# Patient Record
Sex: FEMALE | Race: WHITE
[De-identification: names, ages, dates, MRNs, and addresses within clinical notes are randomized per-mention and may not be internally consistent; named-entity substitution may affect disease eponyms.]

---

## 2017-01-30 ENCOUNTER — HOSPITAL ENCOUNTER (OUTPATIENT)
Dept: HOSPITAL 45 - C.MAMM | Age: 72
Discharge: HOME | End: 2017-01-30
Attending: FAMILY MEDICINE
Payer: COMMERCIAL

## 2017-01-30 DIAGNOSIS — Z86.000: ICD-10-CM

## 2017-01-30 DIAGNOSIS — Z12.31: Primary | ICD-10-CM

## 2017-01-30 NOTE — MAMMOGRAPHY REPORT
UNILATERAL LEFT DIGITAL SCREENING MAMMOGRAM TOMOSYNTHESIS WITH CAD: 1/30/2017

CLINICAL HISTORY: Asymptomatic. Personal history of breast cancer.  





TECHNIQUE: Left breast tomosynthesis in addition to standard 2D mammography was performed. Current rashawn dawson was also evaluated with a Computer Aided Detection (CAD) system.  



COMPARISON: Comparison is made to exams dated:  1/27/2016 mammogram, 10/23/2014 mammogram, 12/9/2013
 mammogram, and 11/19/2012 mammogram - [facility].   



BREAST COMPOSITION:  The tissue of the left breast is heterogeneously dense, which may obscure small
 masses.  



FINDINGS: The hub of a Mediport catheter is partially visualized in the far superior left breast on 
the MLO view.  There is stable asymmetry in the inferior left breast.  Scattered benign rim calcific
ations and vascular calcifications in the left breast.  No suspicious mass, architectural distortion
 or cluster of suspicious microcalcifications is seen.  



IMPRESSION:  ACR BI-RADS CATEGORY 1: NEGATIVE

There is no mammographic evidence of malignancy. A 1 year screening mammogram is recommended.  The p
atient will receive written notification of the results.  





Approximately 10% of breast cancers are not detected with mammography. A negative mammographic repor
t should not delay biopsy if a clinically suggestive mass is present.



Isatu Richards M.D.          

ay/:1/30/2017 15:28:11  



Imaging Technologist: Jada Burleson, [facility]

letter sent: Normal 1/2  

BI-RADS Code: ACR BI-RADS Category 1: Negative

## 2017-03-27 ENCOUNTER — HOSPITAL ENCOUNTER (OUTPATIENT)
Dept: HOSPITAL 45 - C.RDSM | Age: 72
Discharge: HOME | End: 2017-03-27
Attending: FAMILY MEDICINE
Payer: COMMERCIAL

## 2017-03-27 DIAGNOSIS — R22.32: Primary | ICD-10-CM

## 2017-03-27 DIAGNOSIS — M19.042: ICD-10-CM

## 2017-03-27 DIAGNOSIS — M79.9: ICD-10-CM

## 2017-03-27 NOTE — DIAGNOSTIC IMAGING REPORT
LEFT HAND MIN 3 VIEWS



CLINICAL HISTORY: MASS ON POSTERIOR LEFT HAND    



COMPARISON: None.



DISCUSSION: Considerable degenerative change of the first carpometacarpal joint.

Moderate degenerative change throughout all remaining osseous structures. No

well-defined osseous mass. No significant bony exostosis. Mild generalized soft

tissue edema presumably on a degenerative basis.



IMPRESSION: Considerable degenerative changes throughout. Moderate generalized

soft tissue edema.







Electronically signed by:  Cole Simpson M.D.

3/27/2017 4:54 PM



Dictated Date/Time:  3/27/2017 4:53 PM

## 2017-10-03 ENCOUNTER — HOSPITAL ENCOUNTER (OUTPATIENT)
Dept: HOSPITAL 45 - C.RDSM | Age: 72
Discharge: HOME | End: 2017-10-03
Attending: ORTHOPAEDIC SURGERY
Payer: COMMERCIAL

## 2017-10-03 DIAGNOSIS — M70.72: Primary | ICD-10-CM

## 2017-10-03 DIAGNOSIS — Z96.642: ICD-10-CM

## 2018-01-31 ENCOUNTER — HOSPITAL ENCOUNTER (OUTPATIENT)
Dept: HOSPITAL 45 - C.MAMM | Age: 73
Discharge: HOME | End: 2018-01-31
Attending: FAMILY MEDICINE
Payer: COMMERCIAL

## 2018-01-31 DIAGNOSIS — Z12.31: Primary | ICD-10-CM

## 2018-01-31 DIAGNOSIS — Z85.3: ICD-10-CM

## 2018-01-31 DIAGNOSIS — Z08: ICD-10-CM

## 2018-01-31 NOTE — MAMMOGRAPHY REPORT
UNILATERAL LEFT DIGITAL SCREENING MAMMOGRAM TOMOSYNTHESIS WITH CAD: 1/31/2018

CLINICAL HISTORY: Asymptomatic. Personal history of breast cancer.  





TECHNIQUE:  Breast tomosynthesis in addition to standard 2D mammography was performed. Current study 
was also evaluated with a Computer Aided Detection (CAD) system.  



COMPARISON: Comparison is made to exams dated:  1/30/2017 mammogram, 1/27/2016 mammogram, 10/23/2014 
mammogram, 12/9/2013 mammogram, 11/19/2012 mammogram, and 11/22/2011 mammogram - [facility].   



BREAST COMPOSITION:  The tissue of the left breast is heterogeneously dense, which may obscure small 
masses.  



FINDINGS:  There are no suspicious masses, calcifications, or areas of architectural distortion noted
 within the left breast.  There has been no significant interval change compared to prior exams.  Sca
ttered benign-appearing calcifications are not significantly changed.  Asymmetries in the left superi
or and inferior breast are stable compared to multiple prior exams including the 2012 exam.



IMPRESSION:  ACR BI-RADS CATEGORY 2: BENIGN

There is no mammographic evidence of malignancy in the left breast. A 1 year screening mammogram is r
ecommended.  The patient will receive written notification of the results.  





Approximately 10% of breast cancers are not detected with mammography. A negative mammographic report
 should not delay biopsy if a clinically suggestive mass is present.



Pooja Cotton M.D.          

/:1/31/2018 10:41:43  



Imaging Technologist: Kati MANCILLA(ROGELIO)(M), [facility]

letter sent: Normal 1/2  

BI-RADS Code: ACR BI-RADS Category 2: Benign

## 2018-04-02 ENCOUNTER — HOSPITAL ENCOUNTER (OUTPATIENT)
Dept: HOSPITAL 45 - C.EDB | Age: 73
Setting detail: OBSERVATION
LOS: 1 days | Discharge: HOME | End: 2018-04-03
Attending: HOSPITALIST | Admitting: FAMILY MEDICINE
Payer: COMMERCIAL

## 2018-04-02 VITALS
WEIGHT: 178.35 LBS | BODY MASS INDEX: 28.66 KG/M2 | HEIGHT: 66 IN | HEIGHT: 66 IN | WEIGHT: 178.35 LBS | BODY MASS INDEX: 28.66 KG/M2

## 2018-04-02 DIAGNOSIS — H40.9: ICD-10-CM

## 2018-04-02 DIAGNOSIS — Z91.048: ICD-10-CM

## 2018-04-02 DIAGNOSIS — E78.5: ICD-10-CM

## 2018-04-02 DIAGNOSIS — F32.9: ICD-10-CM

## 2018-04-02 DIAGNOSIS — Z79.82: ICD-10-CM

## 2018-04-02 DIAGNOSIS — Z85.3: ICD-10-CM

## 2018-04-02 DIAGNOSIS — K21.9: ICD-10-CM

## 2018-04-02 DIAGNOSIS — R07.2: Primary | ICD-10-CM

## 2018-04-02 DIAGNOSIS — Z88.5: ICD-10-CM

## 2018-04-02 DIAGNOSIS — E11.9: ICD-10-CM

## 2018-04-02 DIAGNOSIS — Z88.6: ICD-10-CM

## 2018-04-02 DIAGNOSIS — Z82.49: ICD-10-CM

## 2018-04-02 DIAGNOSIS — Z91.041: ICD-10-CM

## 2018-04-02 DIAGNOSIS — Z88.8: ICD-10-CM

## 2018-04-02 DIAGNOSIS — Z79.84: ICD-10-CM

## 2018-04-02 DIAGNOSIS — I10: ICD-10-CM

## 2018-04-02 DIAGNOSIS — Z91.040: ICD-10-CM

## 2018-04-02 DIAGNOSIS — Z88.2: ICD-10-CM

## 2018-04-03 VITALS
SYSTOLIC BLOOD PRESSURE: 160 MMHG | OXYGEN SATURATION: 97 % | DIASTOLIC BLOOD PRESSURE: 75 MMHG | HEART RATE: 79 BPM | TEMPERATURE: 98.24 F

## 2018-04-03 VITALS
HEART RATE: 99 BPM | SYSTOLIC BLOOD PRESSURE: 171 MMHG | DIASTOLIC BLOOD PRESSURE: 90 MMHG | OXYGEN SATURATION: 99 % | TEMPERATURE: 98.24 F

## 2018-04-03 VITALS
OXYGEN SATURATION: 98 % | TEMPERATURE: 97.88 F | DIASTOLIC BLOOD PRESSURE: 85 MMHG | HEART RATE: 80 BPM | SYSTOLIC BLOOD PRESSURE: 185 MMHG

## 2018-04-03 VITALS — HEART RATE: 99 BPM | DIASTOLIC BLOOD PRESSURE: 90 MMHG | SYSTOLIC BLOOD PRESSURE: 171 MMHG | OXYGEN SATURATION: 99 %

## 2018-04-03 LAB
ALBUMIN SERPL-MCNC: 3 GM/DL (ref 3.4–5)
ALP SERPL-CCNC: 38 U/L (ref 45–117)
ALT SERPL-CCNC: 20 U/L (ref 12–78)
AST SERPL-CCNC: 15 U/L (ref 15–37)
BASOPHILS # BLD: 0.02 K/UL (ref 0–0.2)
BASOPHILS NFR BLD: 0.2 %
BUN SERPL-MCNC: 16 MG/DL (ref 7–18)
BUN SERPL-MCNC: 17 MG/DL (ref 7–18)
CALCIUM SERPL-MCNC: 8.4 MG/DL (ref 8.5–10.1)
CALCIUM SERPL-MCNC: 8.9 MG/DL (ref 8.5–10.1)
CO2 SERPL-SCNC: 23 MMOL/L (ref 21–32)
CO2 SERPL-SCNC: 24 MMOL/L (ref 21–32)
CREAT SERPL-MCNC: 0.84 MG/DL (ref 0.6–1.2)
CREAT SERPL-MCNC: 0.87 MG/DL (ref 0.6–1.2)
EOS ABS #: 0.21 K/UL (ref 0–0.5)
EOSINOPHIL NFR BLD AUTO: 244 K/UL (ref 130–400)
GLUCOSE SERPL-MCNC: 147 MG/DL (ref 70–99)
GLUCOSE SERPL-MCNC: 157 MG/DL (ref 70–99)
HBA1C MFR BLD: 8 % (ref 4.5–5.6)
HCT VFR BLD CALC: 36.9 % (ref 37–47)
HGB BLD-MCNC: 12.4 G/DL (ref 12–16)
IG#: 0.01 K/UL (ref 0–0.02)
IMM GRANULOCYTES NFR BLD AUTO: 41.4 %
INR PPP: 1 (ref 0.9–1.1)
KETONES UR QL STRIP: 55 MG/DL
LIPASE: 130 U/L (ref 73–393)
LYMPHOCYTES # BLD: 3.38 K/UL (ref 1.2–3.4)
MCH RBC QN AUTO: 30 PG (ref 25–34)
MCHC RBC AUTO-ENTMCNC: 33.6 G/DL (ref 32–36)
MCV RBC AUTO: 89.1 FL (ref 80–100)
MONO ABS #: 0.67 K/UL (ref 0.11–0.59)
MONOCYTES NFR BLD: 8.2 %
NEUT ABS #: 3.87 K/UL (ref 1.4–6.5)
NEUTROPHILS # BLD AUTO: 2.6 %
NEUTROPHILS NFR BLD AUTO: 47.5 %
PH UR: 135 MG/DL (ref 0–200)
PMV BLD AUTO: 9.6 FL (ref 7.4–10.4)
POTASSIUM SERPL-SCNC: 3.8 MMOL/L (ref 3.5–5.1)
POTASSIUM SERPL-SCNC: 4 MMOL/L (ref 3.5–5.1)
PROT SERPL-MCNC: 6.2 GM/DL (ref 6.4–8.2)
RED CELL DISTRIBUTION WIDTH CV: 13.5 % (ref 11.5–14.5)
RED CELL DISTRIBUTION WIDTH SD: 44.1 FL (ref 36.4–46.3)
SODIUM SERPL-SCNC: 139 MMOL/L (ref 136–145)
SODIUM SERPL-SCNC: 140 MMOL/L (ref 136–145)
WBC # BLD AUTO: 8.16 K/UL (ref 4.8–10.8)

## 2018-04-03 RX ADMIN — NITROGLYCERIN PRN MG: 0.4 TABLET SUBLINGUAL at 00:37

## 2018-04-03 RX ADMIN — NITROGLYCERIN PRN MG: 0.4 TABLET SUBLINGUAL at 00:14

## 2018-04-03 NOTE — EXERCISE STRESS ECHO
*NOTICE TO RECEIVING PARTY AGENCY**  This information is strictly Confidential and protected under 
[place] law.  [place] law prohibits you from making any further disclosure of this 
information unless further disclosure is expressly permitted by the written consent of the person to 
whom it pertains or is authorized by law.  A general authorization for the release of medical or 
other information is not sufficient for this purpose.  Hospital accepts no responsibility if the 
information is made available to any other person, INCLUDING THE PATIENT.



Interpretation Summary

  *  Name: SERENA MICHELE  Study Date: 2018 09:31 AM  BP: 156/72 mmHg

  *  MRN: [id number]  Patient Location: [facility]\S\N284\S\2  HR: 83

  *  : 1945 (M/d/yyyy)  Gender: Female  Height: 66 in

  *  Age: 72 yrs  Ethnicity: [place]  Weight: 178 lb

  *  Ordering Physician: Aidan Polanco

  *  Referring Physician: Self, Referred

  *  Performed By: Aggie Alexander RDCS

  *  Accession# [id number]  Account# [id number]

  *  Reason For Study: Chest pain

  *  BSA: 1.9 m2

  *  -- Conclusions --

  *  1. Normal stress echocardiogram at 6.2 METS and a peak heart rate of greater than 100% 
predicted maximum.

  *  2.  No exercise-induced chest pain.

  *  3. No EKG changes.

  *  4. Baseline echocardiogram notes normal left ventricular systolic function, mild LVH, and 
evidence of diastolic dysfunction.

Procedure Details

  *  ECHOEX, CPT #09225

  *  ECHO DOPPLER, CPT #15023

  *  ECHO COLOR FLOW, CPT #67040

Left Ventricle

  *  The left ventricle is normal in size.

  *  There is mild concentric left ventricular hypertrophy.

  *  Ejection Fraction = 55-60%.

  *  Left ventricular systolic function is normal.

  *  Resting wall motion: Normal.  Stress wall motion: Appropriate increase in Left ventricular 
systolic function and decrease in cavity size.  No stress induced segmental wall motion 
abnormalities.

Right Ventricle

  *  The right ventricle is grossly normal size.

  *  The right ventricular systolic function is normal as assessed by tricuspid annular plane 
systolic excursion (TAPSE) (normal >1.5 cm).

Atria

  *  The left atrium is mildly dilated.

  *  Right atrial size is normal.

  *  There is no evidence of atrial septal defect, but resolution does not allow assessment for a 
patent foramen ovale.

Mitral Valve

  *  The mitral valve is grossly normal.

  *  There is no mitral valve stenosis.

  *  There is mild mitral regurgitation.

Tricuspid Valve

  *  The tricuspid valve is not well visualized, but is grossly normal.

  *  There is mild tricuspid regurgitation.

Aortic Valve

  *  The aortic valve is trileaflet.

  *  The aortic valve opens well.

  *  Aortic valve sclerosis mild, without significant aortic valvular stenosis.

  *  No aortic regurgitation is present.

Pulmonic Valve

  *  The pulmonary valve is inadequately visualized, but the Doppler data is adequate for 
interpretation.

Great Vessels

  *  The aortic root is normal size.

  *  The pulmonary is not well visualized.

Pericardium

  *  There is no pericardial effusion.

Stress Parameters

  *  The baseline ECG displays normal sinus rhythm.

  *  Stress ECG: No ST changes.  No arrhythmias.

  *  The stress portion of this study was personally supervised by the undersigned interpreting 
physician.

  *  Rest heart rate was '83' BPM.

  *  Rest blood pressure was '156/72'

  *  Maximum heart rate achieved was 153 bpm.

  *  Maximum heart rate was 103 % of maximum age-predicted heart rate.

  *  Maximum blood pressure was '156/72'

  *  Total exercise time was '4:21'

  *  Maximum exercise MET level achieved was '6.20' METS

  *  Maximum treadmill speed was '2.50' miles per hour.

  *  Maximum treadmill elevation was '12.00'% grade.

  *  Exercise was terminated due to 'achieving target heart rate'

Left Ventricular Diastolic Function

  *  Grade I diastolic dysfunction, (abnormal relaxation pattern).



MMode 2D Measurements and Calculations

IVSd 0.99 cm



LVIDd 3.7 cm

LVIDs 2.6 cm

LVPWd 0.84 cm



IVS/LVPW 1.2 

FS 30.9 %

EDV(Teich) 60.0 ml

ESV(Teich) 24.4 ml

EF(Teich) 59.3 %



EDV(cubed) 52.7 ml

ESV(cubed) 17.4 ml

EF(cubed) 67.0 %





LV mass(C)d 101.9 grams

LV mass(C)dI 53.5 grams/m\S\2



SV(Teich) 35.6 ml

SI(Teich) 18.7 ml/m\S\2

SV(cubed) 35.3 ml

SI(cubed) 18.5 ml/m\S\2



Ao root diam 3.0 cm

Ao root area 7.0 cm\S\2

ACS 1.6 cm

LA dimension 3.0 cm



asc Aorta Diam 3.1 cm





LA/Ao 1.0 

LVOT diam 2.0 cm

LVOT area 3.0 cm\S\2



LVAd ap4 24.9 cm\S\2

LVLd ap4 7.9 cm

EDV(MOD-sp4) 64.9 ml

EDV(sp4-el) 66.6 ml

LVAs ap4 14.3 cm\S\2

LVLs ap4 6.3 cm

ESV(MOD-sp4) 28.4 ml

ESV(sp4-el) 27.3 ml

EF(MOD-sp4) 56.3 %

EF(sp4-el) 59.0 %



LVAd ap2 22.3 cm\S\2

LVLd ap2 7.3 cm

EDV(MOD-sp2) 55.1 ml

EDV(sp2-el) 57.8 ml

LVAs ap2 12.5 cm\S\2

LVLs ap2 5.7 cm

ESV(MOD-sp2) 22.2 ml

ESV(sp2-el) 23.2 ml

EF(MOD-sp2) 59.7 %

EF(sp2-el) 59.8 %



LVLd %diff -7.99 %

EDV(MOD-bp) 62.1 ml

LVLs %diff -11.05 %

ESV(MOD-bp) 26.2 ml

EF(MOD-bp) 57.8 %





SV(MOD-sp4) 36.6 ml

SI(MOD-sp4) 19.2 ml/m\S\2



SV(MOD-sp2) 32.9 ml

SI(MOD-sp2) 17.3 ml/m\S\2



SV(MOD-bp) 35.9 ml

SI(MOD-bp) 18.9 ml/m\S\2



SV(sp4-el) 39.3 ml

SI(sp4-el) 20.6 ml/m\S\2





SV(sp2-el) 34.6 ml

SI(sp2-el) 18.2 ml/m\S\2













Doppler Measurements and Calculations

MV E max derik 89.2 cm/sec

MV A max derik 112.0 cm/sec



MV E/A 0.80 



MV dec time 0.24 sec



Ao V2 max 132.5 cm/sec

Ao max PG 7.0 mmHg

Ao max PG (full) 3.1 mmHg

LEYLA(V,A) 2.2 cm\S\2

LEYLA(V,D) 2.2 cm\S\2





LV V1 max PG 4.0 mmHg



LV V1 max 99.4 cm/sec



PA V2 max 97.7 cm/sec

PA max PG 3.8 mmHg

PA acc slope 771.4 cm/sec\S\2

PA acc time 0.09 sec



TR max derik 194.1 cm/sec





PA pr(Accel) 39.4 mmHg

## 2018-04-03 NOTE — DISCHARGE INSTRUCTIONS
Discharge Instructions


Date of Service


Apr 3, 2018.





Admission


Reason for Admission:  Chest Pain





Discharge


Discharge Diagnosis / Problem:  Chest pain





Discharge Goals


Goal(s):  Decrease discomfort, Improve function, Increase independence, Improve 

disease control, Learn about illness, Diagnostic testing





Activity Recommendations


Activity Limitations:  as noted below


Lifting Limitations:  gradually increase as tolerated


Exercise/Sports Limitations:  gradually increase as tolerated


May Resume Sexual Activity:  when tolerated


Shower/Bathe:  no limitations


Driving or Machine Use:  no limitations





.





Instructions / Follow-Up


Instructions / Follow-Up


Dear Mrs. Youngblood,





You were admitted due to chest pain. You had a stress test that was normal. 

Your cardiac enzymes were normal. 


We recommend increasing the dose of your cholesterol medicine. Please discuss 

with your family doctor. 





If you have any further chest pain, shortness of breath or other concerns, 

please call your pcp or come back to the ER. 





Thank you





Current Hospital Diet


Patient's current hospital diet: AHA Diet (Heart Healthy), Diabetes Type 2 Diet





Discharge Diet


Recommended Diet:  Diabetes Type 2 Diet





Pending Studies


Studies pending at discharge:  no





Laboratory Results





Hemoglobin A1c








Test


  4/3/18


06:03 Range/Units


 


 


Estimated Average Glucose 183   mg/dl


 


Hemoglobin A1c 8.0 H 4.5-5.6  %








Lipid Panel








Test


  4/3/18


06:03 Range/Units


 


 


Triglycerides Level 223 H 0-150  mg/dl


 


Cholesterol Level 135  0-200  mg/dl


 


HDL Cholesterol 35   mg/dl


 


Cholesterol/HDL Ratio 3.9   


 


LDL Cholesterol, Calculated 55   mg/dl











Medical Emergencies








.


Who to Call and When:





Medical Emergencies:  If at any time you feel your situation is an emergency, 

please call 911 immediately.





.





Non-Emergent Contact


Non-Emergency issues call your:  Primary Care Provider


Call Non-Emergent contact if:  your pain is not controlled, your pain is 

worsening, your pain is unusual for you, your pain is concerning you





.


.








"Provider Documentation" section prepared by Oh Ferguson.








.

## 2018-04-03 NOTE — EMERGENCY ROOM VISIT NOTE
History


Report prepared by Laura:  Janette Caal


Under the Supervision of:  Dr. George Quan M.D.


First contact with patient:  23:49


Chief Complaint:  CHEST PAIN


Stated Complaint:  CHEST PAIN





History of Present Illness


The patient is a 72 year old female who presents to the Emergency Room with 

complaints of sudden chest pain starting 5 hours ago. The patient states that 

it started while she was shopping the grocery store and she felt like she was 

going to pass out. She states that she did not sit down and kept pushing the 

cart. The patient reports that her heart rate was 100 and her blood pressure 

was 130/103 when she came home from the store. She describes the pain as a 

pressure. She complains of the pain radiating into her neck and her back. She 

notes that she has felt these symptoms in the past. She notes that she takes a 

baby aspirin each morning. The patient denies a history of a heart attack, 

taking medications for her chest pain, walking making the pain worse, eating 

something spicy, feeling like heart burn, swelling in legs, calf pain, and 

shortness of breath. The patient notes a history of hypertension, diabetes, and 

hyperlipidemia.





   Source of History:  patient


   Onset:  5 hours ago


   Position:  chest


   Quality:  pressure


   Timing:  other (sudden)


   Associated Symptoms:  + neck pain, + back pain, No SOB


Note:


The patient denies swelling in her legs and calf pain.





Review of Systems


See HPI for pertinent positives & negatives. A total of 10 systems reviewed and 

were otherwise negative.





Past Medical & Surgical


Medical Problems:


(1) Breast cancer


(2) DM (diabetes mellitus)


(3) GERD (gastroesophageal reflux disease)


(4) HTN (hypertension)


(5) Hyperlipidemia


(6) Irregular heart beat








Family History





FH: CHF (congestive heart failure)





Social History


Smoking Status:  Never Smoker


Drug Use:  none


Marital Status:  


Housing Status:  lives with family





Current/Historical Medications


Scheduled


Aspirin (Aspirin Chewable), 81 MG PO DAILY


Biotin (Biotin), 1 CAP PO BID


Calcium Carbonate (Calcium), 600 MG PO BID


Cyclosporine (Ophth) (Restasis), 1 DROP OP BID


Escitalopram (Lexapro), 10 MG PO QAM


Escitalopram Oxalate (Lexapro), 20 MG PO QPM


Metformin Hcl (Glucophage), 1,000 MG PO BID


Metoprolol Succinate (Toprol Xl), 50 MG PO HS


Multiple Vitamins W/ Minerals (Multi Complete/Iron), 1 TAB PO DAILY


Omeprazole (Prilosec), 40 MG PO DAILY


Potassium (Potassium), 3 TABS PO DAILY


Simvastatin (Zocor), 20 MG PO QPM


Tamoxifen Citrate (Nolvadex), 20 MG PO QAM


Telmisartan (Micardis), 20 MG PO DAILY


Travoprost (Travatan Z), 1 DROPS OP HS


Zinc Sulfate (Zinc Sulfate), 220 MG PO DAILY





Allergies


Coded Allergies:  


     Acetaminophen (Verified  Allergy, Unknown, `, 4/3/18)


     Adhesives (Verified  Allergy, Unknown, ALLERGY TO ADHESIVE TAPE, 4/3/18)


     Iodinated Diagnostic Agents (Verified  Allergy, Unknown, IVP DYE, 4/3/18)


     Latex1 -Allergic Contact Dermititis (Verified  Allergy, Unknown, 4/3/18)


     Oxycodone (Verified  Allergy, Unknown, `, 4/3/18)


     Sulfa Antibiotics (Verified  Allergy, Unknown, "Sulfa drugs", 4/3/18)


     Atorvastatin (Verified  Adverse Reaction, Mild, GI UPSET, 4/3/18)





Physical Exam


Vital Signs











  Date Time  Temp Pulse Resp B/P (MAP) Pulse Ox O2 Delivery O2 Flow Rate FiO2


 


4/3/18 01:54  75 18 167/93 99 Room Air  


 


4/3/18 01:24  75 18 181/103 97 Room Air  


 


4/3/18 01:00  77 18 154/92 97 Room Air  


 


4/3/18 00:45  81 18 150/88 99 Room Air  


 


4/3/18 00:38  86 18 154/90 98 Room Air  


 


4/3/18 00:30  86  146/100 98 Room Air  


 


4/3/18 00:23  94 18 159/93 97 Room Air  


 


4/3/18 00:15  99 18 186/86 97 Room Air  


 


4/3/18 00:13  95 20 186/101 97 Room Air  


 


4/3/18 00:00     98 Room Air  


 


4/2/18 23:58  86      


 


4/2/18 23:52     99 Room Air  


 


4/2/18 23:52 36.7 89 22 206/123 100 Room Air  











Physical Exam


GENERAL: Patient is mildly anxious appearing and in minimal distress.


EYES: No scleral icterus, unremarkable pupils.


ENT: Mucous membranes moist, no nasal congestion.


NECK: No masses appreciated, no meningismus, trachea is midline.


RESPIRATORY: No dyspnea. Clear to auscultation and equal bilaterally. No wheeze

, no rhonchi.


CARDIOVASCULAR: Regular rate and rhythm.  No murmurs, rubs, gallops appreciated.


GASTROINTESTINAL: Abdomen soft, nontender, no peritonitis.  Bowel sounds 

positive.  No masses appreciated.


BACK: No midline tenderness, no CVA tenderness


EXTREMITIES: Normal motion all extremities, no cyanosis, no edema.


NEUROLOGIC: Alert and oriented, no acute motor or sensory deficits, no focal 

weakness, cranial nerves grossly intact.


SKIN: No rash, no jaundice, no diaphoresis.





Medical Decision & Procedures


ER Provider


Diagnostic Interpretation:


X ray results are stated below per my interpretation:


Chest: 1 view: No infiltrate, no effusion, normal cardiac border.





Laboratory Results


4/2/18 23:55








Red Blood Count 4.14, Mean Corpuscular Volume 89.1, Mean Corpuscular Hemoglobin 

30.0, Mean Corpuscular Hemoglobin Concent 33.6, Mean Platelet Volume 9.6, 

Neutrophils (%) (Auto) 47.5, Lymphocytes (%) (Auto) 41.4, Monocytes (%) (Auto) 

8.2, Eosinophils (%) (Auto) 2.6, Basophils (%) (Auto) 0.2, Neutrophils # (Auto) 

3.87, Lymphocytes # (Auto) 3.38, Monocytes # (Auto) 0.67, Eosinophils # (Auto) 

0.21, Basophils # (Auto) 0.02





4/2/18 23:55

















Test


  4/2/18


23:55


 


White Blood Count


  8.16 K/uL


(4.8-10.8)


 


Red Blood Count


  4.14 M/uL


(4.2-5.4)


 


Hemoglobin


  12.4 g/dL


(12.0-16.0)


 


Hematocrit 36.9 % (37-47) 


 


Mean Corpuscular Volume


  89.1 fL


()


 


Mean Corpuscular Hemoglobin


  30.0 pg


(25-34)


 


Mean Corpuscular Hemoglobin


Concent 33.6 g/dl


(32-36)


 


Platelet Count


  244 K/uL


(130-400)


 


Mean Platelet Volume


  9.6 fL


(7.4-10.4)


 


Neutrophils (%) (Auto) 47.5 % 


 


Lymphocytes (%) (Auto) 41.4 % 


 


Monocytes (%) (Auto) 8.2 % 


 


Eosinophils (%) (Auto) 2.6 % 


 


Basophils (%) (Auto) 0.2 % 


 


Neutrophils # (Auto)


  3.87 K/uL


(1.4-6.5)


 


Lymphocytes # (Auto)


  3.38 K/uL


(1.2-3.4)


 


Monocytes # (Auto)


  0.67 K/uL


(0.11-0.59)


 


Eosinophils # (Auto)


  0.21 K/uL


(0-0.5)


 


Basophils # (Auto)


  0.02 K/uL


(0-0.2)


 


RDW Standard Deviation


  44.1 fL


(36.4-46.3)


 


RDW Coefficient of Variation


  13.5 %


(11.5-14.5)


 


Immature Granulocyte % (Auto) 0.1 % 


 


Immature Granulocyte # (Auto)


  0.01 K/uL


(0.00-0.02)


 


Anion Gap


  8.0 mmol/L


(3-11)


 


Est Creatinine Clear Calc


Drug Dose 62.8 ml/min 


 


 


Estimated GFR (


American) 77.1 


 


 


Estimated GFR (Non-


American 66.6 


 


 


BUN/Creatinine Ratio 18.9 (10-20) 


 


Calcium Level


  8.9 mg/dl


(8.5-10.1)


 


Troponin I


  0.017 ng/ml


(0-0.045)





Laboratory results as reviewed by me.





Medications Administered











 Medications


  (Trade)  Dose


 Ordered  Sig/Chaya


 Route  Start Time


 Stop Time Status Last Admin


Dose Admin


 


 Nitroglycerin


  (Nitrostat Tab)  0.4 mg  Q5M  PRN


 SL  4/3/18 00:00


 4/3/18 03:12 DC 4/3/18 00:37


0.4 MG


 


 Aspirin


  (Aspirin Chew)  324 mg  NOW  STAT


 PO  4/2/18 23:55


 4/2/18 23:57 DC 4/3/18 00:12


324 MG











ECG Per My Interpretation


Indication:  chest pain


Rate (beats per minute):  96


Rhythm:  normal sinus


Findings:  no acute ischemic change, no ectopy, other (QT-c 429)





ED Course


2351: The patient was evaluated in room A10. A complete history and physical 

exam was performed.





2355: Ordered Aspiring 324 mg PO.





0000: Ordered Nitroglycerin 0.4 mg PRN SL Chest pain. 





0052: I reevaluated the patient and she has complete resolution of her chest 

pain after her second Nitroglycerin. She agrees to come into the hospital. Her 

blood pressure is in the 150s. 





0103: Discussed the patient's case with Dr. Ronel LYONS Hospitalist. 

The patient will be evaluated for further treatment and disposition.





Medical Decision


Differential: Cardiac Ischemia (STEMI, NSTEMI, Unstable Angina, etc), Aortic 

Dissection, Arrhythmia, Pulmonary Embolism, Pneumonia, Pneumothorax, MSK, 

Infectious, Pericarditis/Myocarditis, Esophageal Rupture, Gastrointestinal, 

amongst other pathologies entertained.





72 yr old female with history HTN arrives for evaluation of substernal chest 

pressure radiating to anterior neck and back.  Quite hypertensive on arrival.  

BP and CP improved with 2 SLNTG.  EKG OK.  Trop wnl.  WBC OK.  CXR 

unremarkable.  She has no current evidence ACS, PE, Dissection though will need 

to come in for cardiac rule out.  Patient agreeable to this.  Stable and 

without symptoms at time of hospitalist evaluation.





Medication Reconcilliation


Current Medication List:  was personally reviewed by me





Blood Pressure Screening


Patient's blood pressure:  Elevated blood pressure


Will be further monitored by the hospitalist.





Consults


Time Called:  0059


Consulting Physician:  Dr. Ronel LYONS Hospitalist


Returned Call:  [id number]


Discussed the patient's case with Dr. Ronel LYONS Hospitalist. The 

patient will be evaluated for further treatment and disposition.





Impression





 Primary Impression:  


 Substernal chest pain


 Additional Impression:  


 HTN (hypertension)





Scribe Attestation


The scribe's documentation has been prepared under my direction and personally 

reviewed by me in its entirety. I confirm that the note above accurately 

reflects all work, treatment, procedures, and medical decision making performed 

by me.





Departure Information


Referrals


Clau Chandler D.O. (PCP)





Patient Instructions


My [facility]





Problem Qualifiers

## 2018-04-03 NOTE — DIAGNOSTIC IMAGING REPORT
CHEST ONE VIEW PORTABLE



CLINICAL HISTORY: Atypical chest pain    



COMPARISON STUDY:  1/10/2016



FINDINGS: The cardiac images so contours remain stable. There is a left-sided

A-Port catheter unchanged in position. There is no focal pulmonary

consolidation. There is no failure. There are no pleural effusions.[ 



IMPRESSION: No active disease in the chest.







Electronically signed by:  Ryan Mg M.D.

4/3/2018 7:20 AM



Dictated Date/Time:  4/3/2018 7:20 AM

## 2018-04-03 NOTE — HISTORY AND PHYSICAL
History & Physical


Date & Time of Service:


Apr 3, 2018 at 02:08


Chief Complaint:


Chest Pain


Primary Care Physician:


Clau Chandler D.O.


History of Present Illness


Source:  patient, family ()


Pt is a 72F with a PMHx of DM2, HTN, HLD, Breast CA that p/w sudden substernal 

squeezing chest pain that occurred suddenly when she was in [employer] . 

She was not lifting or doing anything strenous when it happened.  The squeezing 

was constant and only when away in the ER when she got Nitro.  She also felt a 

sharp stabbing pain in her back that occurred at the same time.  The patient 

has had intermittent episodes such as these for the past 6 months or so, on 

average 1-2 per week.  She felt that this episode was more painful than her 

previous episodes and that alarmed her.  Her  recommend she come to the 

ER.  Pt rates her pain as 7/10.





Pt has a 3 pack year smoking history in her 20s, doesn't chew, has never had 

pancreatitis in the past or problems with her gallbladder.  





ROS: +ve for SOB on exertion in the past few months.  No hand or feet swelling.

  No calf pain.  No coughing.  





PMHx:  Pt remembers being Cath'd 30+ year ago (before they did caths in state 

[facility]) , had had echos, follows with Dr. Marrufo, no echocardiograms or stress 

tests within the past 3 years.





Past Medical/Surgical History


Medical Problems:


(1) Breast cancer


(2) Chest pain


(3) Chest pain


(4) DM (diabetes mellitus)


(5) GERD (gastroesophageal reflux disease)


(6) HTN (hypertension)


(7) Hyperlipidemia


(8) Irregular heart beat


(9) Precordial chest pain





Family History





FH: CHF (congestive heart failure)





Social History


Smoking Status:  Never Smoker


Smokeless Tobacco Use:  No


Alcohol Use:  none


Drug Use:  none


Marital Status:  


Housing status:  lives with family


Occupational Status:  retired





Immunizations


History of Influenza Vaccine:  Yes


Influenza Vaccine Date:  Nov 13, 2006


History of Tetanus Vaccine?:  Yes


Tetanus Immunization Date:  Jun 13, 2006


History of Pneumococcal:  No


History of Hepatitis B Vaccine:  No





Allergies


Coded Allergies:  


     Acetaminophen (Verified  Allergy, Unknown, `, 4/3/18)


     Adhesives (Verified  Allergy, Unknown, ALLERGY TO ADHESIVE TAPE, 4/3/18)


     Iodinated Diagnostic Agents (Verified  Allergy, Unknown, IVP DYE, 4/3/18)


     Latex1 -Allergic Contact Dermititis (Verified  Allergy, Unknown, 4/3/18)


     Oxycodone (Verified  Allergy, Unknown, `, 4/3/18)


     Sulfa Antibiotics (Verified  Allergy, Unknown, "Sulfa drugs", 4/3/18)


     Atorvastatin (Verified  Adverse Reaction, Mild, GI UPSET, 4/3/18)





Home Medications


Scheduled


Aspirin (Aspirin Chewable), 81 MG PO DAILY


Biotin (Biotin), 1 CAP PO BID


Calcium Carbonate (Calcium), 600 MG PO BID


Cyclosporine (Ophth) (Restasis), 1 DROP OP BID


Escitalopram (Lexapro), 10 MG PO QAM


Escitalopram Oxalate (Lexapro), 20 MG PO QPM


Metformin Hcl (Glucophage), 1,000 MG PO BID


Metoprolol Succinate (Toprol Xl), 50 MG PO HS


Multiple Vitamins W/ Minerals (Multi Complete/Iron), 1 TAB PO DAILY


Omeprazole (Prilosec), 40 MG PO DAILY


Potassium (Potassium), 3 TABS PO DAILY


Simvastatin (Zocor), 20 MG PO QPM


Tamoxifen Citrate (Nolvadex), 20 MG PO QAM


Telmisartan (Micardis), 20 MG PO DAILY


Travoprost (Travatan Z), 1 DROPS OP HS


Zinc Sulfate (Zinc Sulfate), 220 MG PO DAILY





Review of Systems


Constitutional:  No fever, No chills


ENT:  No hearing loss


Respiratory:  + shortness of breath, + dyspnea on exertion, No cough, No sputum

, No wheezing, No dyspnea at rest


Cardiovascular:  + chest pain, No orthopnea, No edema, No claudication, No 

palpitations


Abdomen:  No pain, No nausea, No vomiting, No diarrhea, No constipation


Musculoskeletal:  No joint pain


Genitourinary - Female:  No dysuria


Integumentary:  No rash





Physical Exam


Vital Signs











  Date Time  Temp Pulse Resp B/P (MAP) Pulse Ox O2 Delivery O2 Flow Rate FiO2


 


4/3/18 01:54  75 18 167/93 99 Room Air  


 


4/3/18 01:24  75 18 181/103 97 Room Air  


 


4/3/18 01:00  77 18 154/92 97 Room Air  


 


4/3/18 00:45  81 18 150/88 99 Room Air  


 


4/3/18 00:38  86 18 154/90 98 Room Air  


 


4/3/18 00:30  86  146/100 98 Room Air  


 


4/3/18 00:23  94 18 159/93 97 Room Air  


 


4/3/18 00:15  99 18 186/86 97 Room Air  


 


4/3/18 00:13  95 20 186/101 97 Room Air  


 


4/3/18 00:00     98 Room Air  


 


4/2/18 23:58  86      


 


4/2/18 23:52     99 Room Air  


 


4/2/18 23:52 36.7 89 22 206/123 100 Room Air  








General Appearance:  WD/WN, no apparent distress


Head:  normocephalic, atraumatic


Eyes:  normal inspection, PERRL


ENT:  normal ENT inspection


Neck:  supple


Respiratory/Chest:  chest non-tender, lungs clear, normal breath sounds, no 

respiratory distress, no accessory muscle use


Cardiovascular:  regular rate, rhythm, no edema, no gallop, no JVD, no murmur, 

normal peripheral pulses


Abdomen/GI:  normal bowel sounds, non tender, soft, no organomegaly, no 

pulsatile mass


Back:  normal inspection, no CVA tenderness, no muscle spasm


Extremities/Musculoskelatal:  normal inspection, no calf tenderness, no pedal 

edema


Neurologic/Psych:  CNs II-XII nml as tested, no motor/sensory deficits, alert, 

normal mood/affect, normal reflexes, oriented x 3


Skin:  normal color, warm/dry, no rash





Diagnostics


Laboratory Results





Results Past 24 Hours








Test


  4/2/18


23:55 Range/Units


 


 


White Blood Count 8.16 4.8-10.8  K/uL


 


Red Blood Count 4.14 4.2-5.4  M/uL


 


Hemoglobin 12.4 12.0-16.0  g/dL


 


Hematocrit 36.9 37-47  %


 


Mean Corpuscular Volume 89.1   fL


 


Mean Corpuscular Hemoglobin 30.0 25-34  pg


 


Mean Corpuscular Hemoglobin


Concent 33.6


  32-36  g/dl


 


 


Platelet Count 244 130-400  K/uL


 


Mean Platelet Volume 9.6 7.4-10.4  fL


 


Neutrophils (%) (Auto) 47.5  %


 


Lymphocytes (%) (Auto) 41.4  %


 


Monocytes (%) (Auto) 8.2  %


 


Eosinophils (%) (Auto) 2.6  %


 


Basophils (%) (Auto) 0.2  %


 


Neutrophils # (Auto) 3.87 1.4-6.5  K/uL


 


Lymphocytes # (Auto) 3.38 1.2-3.4  K/uL


 


Monocytes # (Auto) 0.67 0.11-0.59  K/uL


 


Eosinophils # (Auto) 0.21 0-0.5  K/uL


 


Basophils # (Auto) 0.02 0-0.2  K/uL


 


RDW Standard Deviation 44.1 36.4-46.3  fL


 


RDW Coefficient of Variation 13.5 11.5-14.5  %


 


Immature Granulocyte % (Auto) 0.1  %


 


Immature Granulocyte # (Auto) 0.01 0.00-0.02  K/uL


 


Sodium Level 139 136-145  mmol/L


 


Potassium Level 3.8 3.5-5.1  mmol/L


 


Chloride Level 107   mmol/L


 


Carbon Dioxide Level 24 21-32  mmol/L


 


Anion Gap 8.0 3-11  mmol/L


 


Blood Urea Nitrogen 16 7-18  mg/dl


 


Creatinine


  0.87


  0.60-1.20


mg/dl


 


Est Creatinine Clear Calc


Drug Dose 62.8


   ml/min


 


 


Estimated GFR (


American) 77.1


   


 


 


Estimated GFR (Non-


American 66.6


   


 


 


BUN/Creatinine Ratio 18.9 10-20  


 


Random Glucose 147 70-99  mg/dl


 


Calcium Level 8.9 8.5-10.1  mg/dl


 


Troponin I 0.017 0-0.045  ng/ml











EKG


Normal sinus rhythm


Normal ECG


When compared with ECG of 11-JAN-2016 09:26,


No significant change was found





Impression


Assessment and Plan


72F with a PMHx of DM2, HTN, HLD, depression, Breast CA that p/w sudden 

substernal squeezing chest pain that occurred suddenly when she was in [facility]. EKG WNL, trop negative.  Will admit for CP rule out.  Echo ordered.  

Obs to tele.  





Chest Pain of Unknown Origin


Atypical, does not come with exertion.   EKG showed no changes.  


Will trend Trops.


Daily EKGs. 


Echocardiogram.  (consider exercise echo if pt is up for it). 


ASA in the AM.  


CMP, Lipase, Mag Pending.


Pt is on K+ at home, watch.  


Obs on tele.  


No cards consult - can consult as needed.  


Consider Liver US if suspect biliary pathology.  





DM2


Will get HBA1C. 


continue Metformin 1g BID. 





HTN / HLD


Will get fasting lipid panel.  


c/w Metoprolol.  


c/w Telmisartan


c/w Zocor.  





h/o Breast CA


c/w Tamoxifen 20 MG PO QAM





Depression: Continue home regimen, Lexapro 10mg QAM and 20mg QPM.  





Glaucoma: continue Micardis & Travatan drops





Diet: NPO except meds and sips with IVF of NSS at 100mls/hr.  





GERD: PPI





Dispo: Obs to tele.  





DVT Proph: Hep SQ BID, SCDs





Full Code








Attending addendum:








I have physically seen this patient, have supervised the medical residents 

activities, and agree with the H&P unless as otherwise noted.





Assessment and Plan:





Precordial chest pain/hypertension--


The patient will be admitted to telemetry for serial cardiac enzymes, serial  

EKG's, cardiac rhythm monitoring and a 2-D echocardiogram with Dopplers.


Continue metoprolol and telmisartan.





Diabetes mellitus--


Check hemoglobin A1c.


Placed on Accu-Cheks before meals and at bedtime with NovoLog coverage per scale


Hold metformin during acute stage of workup.





Remainder of medications as above.





Advanced Directives


Existing Advance Directive:  No


Existing Living Will:  No


Existing Power of :  No





Resuscitation Status








VTE Prophylaxis


Will order VTE Prophylaxis:  Yes





Social Service Consult


None Apply


Resident Involvement:  Resident Care Provided


Care Provided:  Adult Hospital Medicine

## 2018-04-03 NOTE — FAMILY MEDICINE PROGRESS NOTE
Progress Note


Date of Service


Apr 3, 2018.





Medications





Current Inpatient Medications








 Medications


  (Trade)  Dose


 Ordered  Sig/Chaya


 Route  Start Time


 Stop Time Status Last Admin


Dose Admin


 


 Heparin Sodium


  (Porcine)


  (Heparin Sq 5000


 Unit/0.5ml)  5,000 unit  Q12


 SQ  4/3/18 09:00


 5/3/18 08:59   


 


 


 Sodium Chloride  1,000 ml @ 


 100 mls/hr  Q10H


 IV  4/3/18 03:30


 5/3/18 03:29  4/3/18 03:38


100 MLS/HR


 


 Al Hydrox/Mg


 Hydrox/Simethicone


  (Maalox Max Susp)  15 ml  Q4H  PRN


 PO  4/3/18 02:15


 5/3/18 02:14   


 


 


 Magnesium


 Hydroxide


  (Milk Of


 Magnesia Susp)  30 ml  Q12H  PRN


 PO  4/3/18 02:15


 5/3/18 02:14   


 


 


 Zolpidem Tartrate


  (Ambien Tab)  5 mg  HSZ  PRN


 PO  4/3/18 02:15


 5/3/18 02:14   


 


 


 Ondansetron HCl


  (Zofran Inj)  4 mg  Q6H  PRN


 IV  4/3/18 02:15


 5/3/18 02:14   


 


 


 Nitroglycerin


  (Nitrostat Tab)  0.4 mg  UD  PRN


 SL  4/3/18 02:15


 5/3/18 02:14   


 


 


 Polyethylene


  (Miralax Powder


 Packet)  17 gm  DAILY  PRN


 PO  4/3/18 02:15


 5/3/18 02:14   


 


 


 Aspirin


  (Aspirin Chew)  81 mg  DAILY


 PO  4/3/18 09:00


 5/3/18 08:59  4/3/18 08:44


81 MG


 


 Escitalopram


 Oxalate


  (Lexapro Tab)  10 mg  QAM


 PO  4/3/18 09:00


 5/3/18 08:59  4/3/18 08:44


10 MG


 


 Escitalopram


 Oxalate


  (Lexapro Tab)  20 mg  QPM


 PO  4/3/18 21:00


 5/3/18 20:59   


 


 


 Metformin HCl


  (Glucophage Tab)  1,000 mg  BIDM


 PO  4/3/18 08:00


 5/3/18 07:59  4/3/18 08:42


1,000 MG


 


 Metoprolol


 Succinate


  (Toprol Xl Tab)  50 mg  HS


 PO  4/3/18 21:00


 5/3/18 20:59   


 


 


 Multivitamins/


 Minerals


  (Multivitamin W/


 Minerals Tab)  1 tab  DAILY


 PO  4/3/18 09:00


 5/3/18 08:59  4/3/18 08:44


1 TAB


 


 Simvastatin


  (Zocor Tab)  20 mg  QPM


 PO  4/3/18 21:00


 5/3/18 20:59   


 


 


 Tamoxifen Citrate


  (Nolvadex Tab)  20 mg  QAM


 PO  4/3/18 09:00


 5/3/18 08:59  4/3/18 08:44


20 MG


 


 Telmisartan


  (Micardis Tab)  20 mg  DAILY


 PO  4/3/18 09:00


 5/3/18 08:59  4/3/18 08:42


20 MG


 


 Travoprost


  (Travatan Z)  1 drops  HS


 OP  4/3/18 21:00


 5/3/18 20:59   


 


 


 Miscellaneous


  (Iv Fluids


 Completed)  1 ea  PRN  PRN


 N/A  4/3/18 04:15


 4/3/19 04:14   


 











Objective


Vital Signs











  Date Time  Temp Pulse Resp B/P (MAP) Pulse Ox O2 Delivery O2 Flow Rate FiO2


 


4/3/18 07:22 36.8 79 16 160/75 (103) 97   


 


4/3/18 03:15 36.6 80 16 185/85 98 Room Air  


 


4/3/18 03:07  79 18 155/87 96   


 


4/3/18 01:54  75 18 167/93 99 Room Air  


 


4/3/18 01:24  75 18 181/103 97 Room Air  


 


4/3/18 01:00  77 18 154/92 97 Room Air  


 


4/3/18 00:45  81 18 150/88 99 Room Air  


 


4/3/18 00:38  86 18 154/90 98 Room Air  


 


4/3/18 00:30  86  146/100 98 Room Air  


 


4/3/18 00:23  94 18 159/93 97 Room Air  


 


4/3/18 00:15  99 18 186/86 97 Room Air  


 


4/3/18 00:13  95 20 186/101 97 Room Air  


 


4/3/18 00:00     98 Room Air  


 


4/2/18 23:58  86      


 


4/2/18 23:52     99 Room Air  


 


4/2/18 23:52 36.7 89 22 206/123 100 Room Air  











Laboratory Results





Results Past 24 Hours








Test


  4/2/18


23:55 4/3/18


06:03 Range/Units


 


 


White Blood Count 8.16  4.8-10.8  K/uL


 


Red Blood Count 4.14  4.2-5.4  M/uL


 


Hemoglobin 12.4  12.0-16.0  g/dL


 


Hematocrit 36.9  37-47  %


 


Mean Corpuscular Volume 89.1    fL


 


Mean Corpuscular Hemoglobin 30.0  25-34  pg


 


Mean Corpuscular Hemoglobin


Concent 33.6


  


  32-36  g/dl


 


 


Platelet Count 244  130-400  K/uL


 


Mean Platelet Volume 9.6  7.4-10.4  fL


 


Neutrophils (%) (Auto) 47.5   %


 


Lymphocytes (%) (Auto) 41.4   %


 


Monocytes (%) (Auto) 8.2   %


 


Eosinophils (%) (Auto) 2.6   %


 


Basophils (%) (Auto) 0.2   %


 


Neutrophils # (Auto) 3.87  1.4-6.5  K/uL


 


Lymphocytes # (Auto) 3.38  1.2-3.4  K/uL


 


Monocytes # (Auto) 0.67  0.11-0.59  K/uL


 


Eosinophils # (Auto) 0.21  0-0.5  K/uL


 


Basophils # (Auto) 0.02  0-0.2  K/uL


 


RDW Standard Deviation 44.1  36.4-46.3  fL


 


RDW Coefficient of Variation 13.5  11.5-14.5  %


 


Immature Granulocyte % (Auto) 0.1   %


 


Immature Granulocyte # (Auto) 0.01  0.00-0.02  K/uL


 


Sodium Level 139 140 136-145  mmol/L


 


Potassium Level 3.8 4.0 3.5-5.1  mmol/L


 


Chloride Level 107 108   mmol/L


 


Carbon Dioxide Level 24 23 21-32  mmol/L


 


Anion Gap 8.0 9.0 3-11  mmol/L


 


Blood Urea Nitrogen 16 17 7-18  mg/dl


 


Creatinine


  0.87


  0.84


  0.60-1.20


mg/dl


 


Est Creatinine Clear Calc


Drug Dose 62.8


  64.9


   ml/min


 


 


Estimated GFR (


American) 77.1


  80.5


   


 


 


Estimated GFR (Non-


American 66.6


  69.4


   


 


 


BUN/Creatinine Ratio 18.9 20.3 10-20  


 


Random Glucose 147 157 70-99  mg/dl


 


Calcium Level 8.9 8.4 8.5-10.1  mg/dl


 


Troponin I 0.017 0.020 0-0.045  ng/ml


 


Prothrombin Time


  


  10.6


  9.0-12.0


SECONDS


 


Prothromb Time International


Ratio 


  1.0


  0.9-1.1  


 


 


Estimated Average Glucose  183  mg/dl


 


Hemoglobin A1c  8.0 4.5-5.6  %


 


Magnesium Level  1.7 1.8-2.4  mg/dl


 


Total Bilirubin  0.3 0.2-1  mg/dl


 


Aspartate Amino Transf


(AST/SGOT) 


  15


  15-37  U/L


 


 


Alanine Aminotransferase


(ALT/SGPT) 


  20


  12-78  U/L


 


 


Alkaline Phosphatase  38   U/L


 


Total Protein  6.2 6.4-8.2  gm/dl


 


Albumin  3.0 3.4-5.0  gm/dl


 


Globulin  3.2 2.5-4.0  gm/dl


 


Albumin/Globulin Ratio  0.9 0.9-2  


 


Triglycerides Level  223 0-150  mg/dl


 


Cholesterol Level  135 0-200  mg/dl


 


HDL Cholesterol  35  mg/dl


 


LDL Cholesterol, Calculated  55  mg/dl


 


VLDL Cholesterol, Calculated  45  mg/dl


 


Cholesterol/HDL Ratio  3.9  


 


Lipase  130   U/L


 


Hepatitis C Antibody Screen  NEG NEG  











Assessment and Plan














Resident Physician Supervision Note:





I interviewed and examined the patient. Discussed with Dr. Ferguson and agree 

with findings and plan as documented in the note. Any exceptions or 

clarifications are listed here: None





Documented By:  Aidan Polanco


feeling ok


stress negative


wants to go home


discussed ambulatory BP monitoring


vitals noted nad breathing unlabored no pallor or icterus





chest pain - noncardiac - stable for home.  suspect GI source





elevated BP  - asymptomatic - has a good cuff at home - f/u multiple readings 

over the next week then f/u w PCP - meds may need adjusted but certainly no 

urgency, and BP may be up just from stress of event





stable for home

## 2018-04-04 NOTE — DISCHARGE SUMMARY
Discharge Summary


Date of Service


Apr 3, 2018.





Discharge Summary


Admission Date:


Apr 3, 2018 at 02:21


Discharge Date:  Apr 3, 2018


Discharge Disposition:  Home


Principal Diagnosis:  Chest pain


Problems/Secondary Diagnoses:


(1) HTN (hypertension)


Status: Chronic  








Immunizations:  


   Have You Had Influenza Vaccine:  Yes


   Influenza Vaccine Date:  Nov 13, 2006


   History of Tetanus Vaccine?:  Yes


   Tetanus Immunization Date:  Jun 13, 2006


   History of Pneumococcal:  No


   History of Hepatitis B Vaccine:  No


Procedures:





CHEST ONE VIEW PORTABLE





CLINICAL HISTORY: Atypical chest pain    





COMPARISON STUDY:  1/10/2016





FINDINGS: The cardiac images so contours remain stable. There is a left-sided


A-Port catheter unchanged in position. There is no focal pulmonary


consolidation. There is no failure. There are no pleural effusions.[ 





IMPRESSION: No active disease in the chest.





Medication Reconciliation


Continued Medications:  


Aspirin (Aspirin Chewable) 81 Mg Chew


81 MG PO DAILY, TAB





Biotin (Biotin) 1 Mg Cap


1 CAP PO BID





Calcium Carbonate (Calcium) 600 Mg Tab


600 MG PO BID





Cyclosporine (Ophth) (Restasis) 0.05 % Emu


1 DROP OP BID, BTL





Escitalopram (Lexapro) 10 Mg Tab


10 MG PO QAM





Escitalopram Oxalate (Lexapro) 20 Mg Tab


20 MG PO QPM, TAB





Metformin Hcl (Glucophage) 1,000 Mg Tab


1000 MG PO BID, TAB





Metoprolol Succinate (Toprol Xl) 50 Mg Tabcr


50 MG PO HS





Multiple Vitamins W/ Minerals (Multi Complete/Iron) 1 Tab Tab


1 TAB PO DAILY





Omeprazole (Prilosec) 40 Mg Cap


40 MG PO DAILY, CAP





Potassium (Potassium) 75 Mg Tab


3 TABS PO DAILY





Simvastatin (Zocor) 20 Mg Tab


20 MG PO QPM, TAB





Tamoxifen Citrate (Nolvadex) 20 Mg Tab


20 MG PO QAM, TAB





Telmisartan (Micardis) 20 Mg Tab


20 MG PO DAILY





Travoprost (Travatan Z) 0.004 % Moncho


1 DROPS OP HS





Zinc Sulfate (Zinc Sulfate) 220 Mg Cap


220 MG PO DAILY, CAP











Discharge Exam


General Appearance:  WD/WN, no apparent distress


Head:  normocephalic, atraumatic


Eyes:  normal inspection, PERRL


ENT:  normal ENT inspection


Neck:  supple


Respiratory/Chest:  chest non-tender, lungs clear, normal breath sounds, no 

respiratory distress, no accessory muscle use


Cardiovascular:  regular rate, rhythm, no edema, no gallop, no JVD, no murmur, 

normal peripheral pulses


Abdomen/GI:  normal bowel sounds, non tender, soft, no organomegaly, no 

pulsatile mass


Extremities/Musculoskeletal:  normal inspection, no calf tenderness, no pedal 

edema


Neurologic/Psych:  CNs II-XII nml as tested, no motor/sensory deficits, alert, 

normal mood/affect, normal reflexes, oriented x 3


Skin:  normal color, warm/dry, no rash


Review of Systems:  


   Constitutional:  No fever, No chills


   Respiratory:  No cough, No shortness of breath


   Cardiovascular:  No chest pain, No edema, No palpitations


   Abdomen:  No pain, No nausea, No vomiting


   Genitourinary - Female:  No dysuria, No urinary frequency, No urinary urgency


   Integumentary:  No rash, No itch


Physical Exam:  


   General Appearance:  WD/WN, no apparent distress


   Eyes:  PERRL, EOMI


   Neck:  supple, no adenopathy, trachea midline


   Respiratory/Chest:  lungs clear, normal breath sounds, no respiratory 

distress


   Cardiovascular:  regular rate, rhythm, no edema, no murmur


   Abdomen / GI:  normal bowel sounds, non tender, soft, no organomegaly


   Extremities:  no calf tenderness, no pedal edema


   Neurologic/Psychiatric:  alert, normal mood/affect, oriented x 3





Hospital Course


H&P





Pt is a 72F with a PMHx of DM2, HTN, HLD, Breast CA that p/w sudden substernal 

squeezing chest pain that occurred suddenly when she was in [employer] . 

She was not lifting or doing anything strenous when it happened.  The squeezing 

was constant and only when away in the ER when she got Nitro.  She also felt a 

sharp stabbing pain in her back that occurred at the same time.  The patient 

has had intermittent episodes such as these for the past 6 months or so, on 

average 1-2 per week.  She felt that this episode was more painful than her 

previous episodes and that alarmed her.  Her  recommend she come to the 

ER.  Pt rates her pain as 7/10.


Pt has a 3 pack year smoking history in her 20s, doesn't chew, has never had 

pancreatitis in the past or problems with her gallbladder.  





ROS: +ve for SOB on exertion in the past few months.  No hand or feet swelling.

  No calf pain.  No coughing.  





PMHx:  Pt remembers being Cath'd 30+ year ago (before they did caths in [place]) , had had echos, follows with Dr. Marrufo, no echocardiograms or stress 

tests within the past 3 











COURSE


Patient on arrival had unremarkable EKG, negative troponin. SHe was admitted  

to telemetry for chest pain rule out. During admission, second troponin was 

negative. CXR was negative. Stress echo was ordered and found to be normal 

although Baseline echocardiogram notes normal left ventricular systolic function

, mild LVH, and evidence of diastolic dysfunction. Based on negative workup, it 

was concluded that patient's chest pain was likely non-cardiac. She was 

discharged home with Primary care followup.





Resident Physician Supervision Note:





I interviewed and examined the patient. Discussed with Dr. Ferguson and agree 

with findings and plan as documented in the note. Any exceptions or 

clarifications are listed here: None





Documented By:  Aidan Collin


feleing better no chest pain workup negative


vitals noted nad breathing unlabored





chest pain - noncardiac, stable for home


Total Time Spent:  Less than 30 minutes


This includes examination of the patient, discharge planning, medication 

reconciliation, and communication with other providers.





Discharge Instructions


Please refer to the electronic Patient Visit Report (Discharge Instructions) 

for additional information.





Additional Copies To


Clau Chandler D.O.